# Patient Record
(demographics unavailable — no encounter records)

---

## 2024-11-07 NOTE — DISCUSSION/SUMMARY
[de-identified] : modify activities try OTC meds ice as needed try topical lidocaine for pain control reviewed current medications used by this patient home exercises for functional return  RE:  TELLO GARCIA   [id number]   Attention:  Nurse Reviewer /Medical Director    Based on my patient's condition, I strongly believe that the MRI both shoulders is medically.necessary.   The patient has failed oral meds, injections and PT and conservative treatment in combination or by themselves and therefore needs the MRI.   The MRI will dictate further treatment t recommendations.

## 2024-11-07 NOTE — PHYSICAL EXAM
[Sitting] : sitting [4___] : internal rotation 4[unfilled]/5 [Right] : right shoulder [There are no fractures, subluxations or dislocations. No significant abnormalities are seen] : There are no fractures, subluxations or dislocations. No significant abnormalities are seen [Bilateral] : shoulder bilaterally [] : no scapular winging [Degenerative change] : Degenerative change [FreeTextEntry1] : L >R [TWNoteComboBox7] : active forward flexion 160 degrees [TWNoteComboBox6] : internal rotation 20 degrees [de-identified] : external rotation 30 degrees

## 2024-11-07 NOTE — HISTORY OF PRESENT ILLNESS
[Rest] : rest [Sudden] : sudden [7] : 7 [6] : 6 [Sharp] : sharp [Tingling] : tingling [Intermittent] : intermittent [Meds] : meds [Lying in bed] : lying in bed [de-identified] : 66 year old RHD male with pain in the right shoulder, symptoms started about 3 months ago on R , longer time on left, no specific injury. Pain at night when sleeping, reaching over head, behind back, lifting. No injury. He denies radicular complaints into the right arm. No prior history of injury to the shoulder. Uses motrin with some help [] : no [FreeTextEntry1] : BT shoulder  [FreeTextEntry5] : Patient states NO INJURY. Pain began about 3 months ago and as been getting worse. LT shoulder has been hurting for a longer time and is going into hand.  [FreeTextEntry7] : bicep and left hand  [FreeTextEntry9] : motrin

## 2024-11-25 NOTE — DISCUSSION/SUMMARY
[de-identified] : Patient allowed to gently start resuming activities. Discussed change to medication prescription and usage. Bracing options discussed with patient for stability and support. Activity modification as needed Discussed poss future surgery, pt deciding, questions answered, no guarantees AS both benny danielle, labral debride try topical lidocaine for pain control reviewed current medications used by this patient Home exercises for functional return

## 2024-11-25 NOTE — HISTORY OF PRESENT ILLNESS
[Dull/Aching] : dull/aching [Sharp] : sharp [Leisure] : leisure [Sleep] : sleep [Rest] : rest [Meds] : meds [de-identified] :  pain in the right shoulder, symptoms started over 3 months ago on R , longer time on left, no specific injury. Pain at night when sleeping, reaching over head, behind back, lifting. No injury. He denies radicular complaints into the right arm. No prior history of injury to the shoulder. Uses motrin with some help, had MRIs [Sudden] : sudden [7] : 7 [6] : 6 [Tingling] : tingling [Intermittent] : intermittent [Lying in bed] : lying in bed [] : no [FreeTextEntry1] : BT shoulder  [FreeTextEntry5] : Patient states NO INJURY. Pain began about 3 months ago and as been getting worse. LT shoulder has been hurting for a longer time and is going into hand.  [FreeTextEntry7] : bicep and left hand  [FreeTextEntry9] : motrin  [de-identified] : Dr. Jonas [de-identified] : MRI

## 2024-11-25 NOTE — PHYSICAL EXAM
[Bilateral] : shoulder bilaterally [Sitting] : sitting [5___] : internal rotation 5[unfilled]/5 [] : motor and sensory intact distally [FreeTextEntry8] : sl [TWNoteComboBox7] : active forward flexion 160 degrees [TWNoteComboBox6] : internal rotation 20 degrees [de-identified] : external rotation 30 degrees

## 2024-11-25 NOTE — DATA REVIEWED
[Right] : of the right [MRI] : MRI [Left] : left [Shoulder] : shoulder [Report was reviewed and noted in the chart] : The report was reviewed and noted in the chart [I reviewed the films/CD and agree] : I reviewed the films/CD and agree [FreeTextEntry1] : labral tear, tendonitis, strain RTC IS [FreeTextEntry2] : labral tear, IS tendonitis, strain RTC, degen changes

## 2024-12-03 NOTE — IMAGING
[de-identified] : CSPINE Inspection: No rash or ecchymosis Palpation: TTP in traps, rhomboids, paracervicals ROM: Limited all planes Strength: 5/5 bilateral deltoid, biceps, triceps, wrist flexors, wrist extensors, , abductors Sensation: Sensation present to light touch bilateral C5-T1 distributions Reflexes: Negative Choudhury's bilaterally able to tandem gait [Facet arthropathy] : Facet arthropathy [Disc space narrowing] : Disc space narrowing

## 2024-12-03 NOTE — HISTORY OF PRESENT ILLNESS
[de-identified] : Dr. Jonas note- pain in the right shoulder, symptoms started over 3 months ago on R , longer time on left, no specific injury. Pain at night when sleeping, reaching over head, behind back, lifting. No injury. He denies radicular complaints into the right arm. No prior history of injury to the shoulder. Uses motrin with some help, had MRIs Pt seen by non-spine partners in the past ================================= 12/3/24- TELLO GARCIA is a 66 year old male presenting with neck pain, no reported injury. Patient states he has pain in the neck that travels down the LEFT arm for the past few months; N/T into the fingers. Patient was treated by Dr. Jonas for the shoulder and was told to follow up for the neck. RHDM. Not in PT. Meloxicam for shoulder pain somewhat helpful for neck.  No bb dysfunction. No balance issues, walks unassisted, no handrails.

## 2024-12-03 NOTE — ASSESSMENT
[FreeTextEntry1] : PT, meds  Will discuss MRI Gabapentin- Patient advised of sedating effects, instructed not to drive, operate machinery, or take with other sedating medications. Advised of need to taper on/off medication and risk of abruptly stopping gabapentin.

## 2025-01-06 NOTE — DISCUSSION/SUMMARY
[de-identified] : modify activities try OTC meds ice as needed try topical lidocaine for pain control reviewed current medications used by this patient home exercises for functional return 01/06/2025    [facility]  PETER JOSE   [id number]    Attention:  Nurse Reviewer /Medical Director  I am writing this letter as a medical necessity for PT program. Patient has tried analgesics, non-steroid anti-inflammatory agents,  hot or cold compresses,injections of corticosteroids, etc)  which in combination or by themselves has not worked. Based on my patient's condition, I strongly believe that the PT is medically needed.   Thank you for your time and consideration.

## 2025-01-06 NOTE — HISTORY OF PRESENT ILLNESS
[8] : 8 [4] : 4 [Dull/Aching] : dull/aching [Sharp] : sharp [Frequent] : frequent [Leisure] : leisure [Sleep] : sleep [Rest] : rest [Meds] : meds [Physical therapy] : physical therapy [Lying in bed] : lying in bed [de-identified] : pain in the right shoulder, symptoms started over 34 or 5 months ago on Right,  for a longer time on left, no specific injury. Pain at night when sleeping, reaching over head, behind back, lifting. No injury.  Labral tears and cuff tendinitis bilateral shoulders on MRI.  Symptoms have slightly increased symptoms. His PMD does not want him to have csi [] : no [FreeTextEntry1] : Right Shoulder [FreeTextEntry9] : prescribed meds [de-identified] : Reaching [de-identified] : Dr. Jonas

## 2025-01-06 NOTE — PHYSICAL EXAM
[Bilateral] : shoulder bilaterally [Sitting] : sitting [5___] : internal rotation 5[unfilled]/5 [] : no swelling [FreeTextEntry8] : sl [TWNoteComboBox7] : active forward flexion 160 degrees [TWNoteComboBox6] : internal rotation 25 degrees [de-identified] : external rotation 30 degrees

## 2025-01-21 NOTE — IMAGING
[Facet arthropathy] : Facet arthropathy [Disc space narrowing] : Disc space narrowing [de-identified] : CSPINE Inspection: No rash or ecchymosis Palpation: TTP in traps, rhomboids, paracervicals ROM: Limited all planes Strength: 5/5 bilateral deltoid, biceps, triceps, wrist flexors, wrist extensors, , abductors Sensation: Sensation present to light touch bilateral C5-T1 distributions Reflexes: Negative Choudhury's bilaterally able to tandem gait

## 2025-01-21 NOTE — ASSESSMENT
[FreeTextEntry1] : Patient presents to office following up for persistent neck pain with intermittent radiculopathy.  cherie conservative measures of nsaids, PT.  Will try to acquire an MRI of cervical spine to eval severity of degeneration, HNP f/u after MRI to go over results or sooner if worsening of symptoms, educated on red flag symptoms  nsaids prn for pain NSAIDs- Patient warned of risk of medication to GI tract, increased blood pressure, cardiac risk, and risk of fluid retention.  Advised to clear medication with internist or PCP if any concurrent health problem with heart, blood pressure, or GI system exists. Gabapentin- Patient advised of sedating effects, instructed not to drive, operate machinery, or take with other sedating medications. Advised of need to taper on/off medication and risk of abruptly stopping gabapentin.

## 2025-01-21 NOTE — HISTORY OF PRESENT ILLNESS
[de-identified] : Dr. Jonas note- pain in the right shoulder, symptoms started over 3 months ago on R , longer time on left, no specific injury. Pain at night when sleeping, reaching over head, behind back, lifting. No injury. He denies radicular complaints into the right arm. No prior history of injury to the shoulder. Uses motrin with some help, had MRIs Pt seen by non-spine partners in the past ================================= 12/3/24- TELLO GARCIA is a 66 year old male presenting with neck pain, no reported injury. Patient states he has pain in the neck that travels down the LEFT arm for the past few months; N/T into the fingers. Patient was treated by Dr. Jonas for the shoulder and was told to follow up for the neck. RHDM. Not in PT. Meloxicam for shoulder pain somewhat helpful for neck.  No bb dysfunction. No balance issues, walks unassisted, no handrails.  1/21/25- following up for persistent neck pain with intermittent radicular symptoms. since last visit symptoms have persisted, attending PT worsened pain to point affecting quality of life and preventing some adl's.

## 2025-02-25 NOTE — HISTORY OF PRESENT ILLNESS
[Neck] : neck [de-identified] : 02/25/2025: 66 year-old male patient presents for neck pain and a 2nd opinion. Has C MRI from 1/29/25. Patient has done PT with mild relief for the past 6-8 weeks. Has taken nsaids but not too frequently due to nausea. Patient notes no weakness with BUE. Patient has pain the R shoulder with radicular pain in the LUE. Sxs started August 2024.

## 2025-02-25 NOTE — DATA REVIEWED
[MRI] : MRI [Cervical Spine] : cervical spine [Report was reviewed and noted in the chart] : The report was reviewed and noted in the chart [I independently reviewed and interpreted images and report] : I independently reviewed and interpreted images and report [FreeTextEntry1] : R severe NF narrowing C2-3, C3-6 stenosis

## 2025-02-25 NOTE — ASSESSMENT
[FreeTextEntry1] : 66 year-old male patient presents for neck pain and a 2nd opinion. Has C MRI from 1/29/25. Patient has done PT with mild relief for the past 6-8 weeks. Has taken nsaids but not too frequently due to nausea. Patient notes no weakness with BUE. Patient has pain the R shoulder with radicular pain in the LUE. Discussed risk of not undergoing surgery. Since patient doesn't report weakness then surgical intervention is not urgent.  there is multilevel cord compression with deformation and shifting, no myelomalacia; IF something unusual like a bad car accident or a fall from even a standing height does put his cord at heightened risk for something neurologically catastrophic to occur;  f/u 4-6 months to check in with patient. If sxs worsen then f/u sooner

## 2025-02-26 NOTE — HISTORY OF PRESENT ILLNESS
[8] : 8 [4] : 4 [Dull/Aching] : dull/aching [Sharp] : sharp [Frequent] : frequent [Leisure] : leisure [Sleep] : sleep [Rest] : rest [Meds] : meds [Physical therapy] : physical therapy [Lying in bed] : lying in bed [de-identified] : pain in the right shoulder, symptoms started many months ago on Right,been bothering him for a longer time on left.. Pain at night when sleeping, reaching over head, behind back, lifting. No injury.  Labral tears and cuff tendinitis bilateral shoulders on MRI.  Symptoms have slightly increased symptoms. His PMD does not want him to have csi [] : no [FreeTextEntry1] : Right Shoulder [FreeTextEntry9] : prescribed meds [de-identified] : Reaching [de-identified] : Dr. Jonas

## 2025-02-26 NOTE — PHYSICAL EXAM
[Bilateral] : shoulder bilaterally [Sitting] : sitting [5___] : internal rotation 5[unfilled]/5 [] : motor and sensory intact distally [FreeTextEntry8] : sl [TWNoteComboBox7] : active forward flexion 160 degrees [TWNoteComboBox6] : internal rotation 30 degrees [de-identified] : external rotation 30 degrees

## 2025-02-26 NOTE — DISCUSSION/SUMMARY
[de-identified] : modify activities try OTC meds ice as needed try topical lidocaine for pain control reviewed current medications used by this patient home exercises for functional return 2/26/25  [facility]  [facility]ARC[facility]   [id number]    Attention:  Nurse Reviewer /Medical Director  I am writing this letter as a medical necessity for PT program. Patient has tried analgesics, non-steroid anti-inflammatory agents,  hot or cold compresses,injections of corticosteroids, etc)  which in combination or by themselves has not worked. Based on my patient's condition, I strongly believe that the PT is medically needed.   Thank you for your time and consideration.    poss surgery in future

## 2025-03-04 NOTE — HISTORY OF PRESENT ILLNESS
[de-identified] : Dr. Jonas note- pain in the right shoulder, symptoms started over 3 months ago on R , longer time on left, no specific injury. Pain at night when sleeping, reaching over head, behind back, lifting. No injury. He denies radicular complaints into the right arm. No prior history of injury to the shoulder. Uses motrin with some help, had MRIs Pt seen by non-spine partners in the past  1/29/25 Cervical MRI  - report noted in chart.  Ind. review- Severe stenosis C3-7 ================================= 12/3/24- TELLO GARCIA is a 66 year old male presenting with neck pain, no reported injury. Patient states he has pain in the neck that travels down the LEFT arm for the past few months; N/T into the fingers. Patient was treated by Dr. Jonas for the shoulder and was told to follow up for the neck. RHDM. Not in PT. Meloxicam for shoulder pain somewhat helpful for neck.  No bb dysfunction. No balance issues, walks unassisted, no handrails.  1/21/25- following up for persistent neck pain with intermittent radicular symptoms. since last visit symptoms have persisted, attending PT worsened pain to point affecting quality of life and preventing some adl's.  2/11/25- MRI f/u. Stil down the LUE. Patient reports shakiness in hands R>L 3/4/25- patient presents to office f/u for improved cervical pain, Physical therapy 2x a week. intermittent pins/needles sensation. Still feels shaky in the hands. Not dropping things currently.

## 2025-03-04 NOTE — IMAGING
[Facet arthropathy] : Facet arthropathy [Disc space narrowing] : Disc space narrowing [de-identified] : CSPINE Inspection: No rash or ecchymosis Palpation: TTP in traps, rhomboids, paracervicals ROM: Limited all planes Strength: 5/5 bilateral deltoid, biceps, triceps, wrist flexors, wrist extensors, , abductors Sensation: Sensation present to light touch bilateral C5-T1 distributions Reflexes: Negative Choudhury's bilaterally able to tandem gait

## 2025-03-04 NOTE — ASSESSMENT
[FreeTextEntry1] : Patient presents to office following up for persistent neck pain with intermittent radiculopathy.  conservative measures of PT with relief.    Severe stenosis C3-7 Discussed that patient won't need surgery urgently, but will need to eventually.  Have patient monitor sxs to note for any changes in ADL.  f/u 3 months  We have discussed the diagnosis cervical myelopathy. We have discussed the step-wise nature of neurologic decline. We have discussed the rationale for surgery for myelopathy, to maintain neurologic function, and that gain of function is not a guarantee. We have discussed monitoring for progression of disease.

## 2025-04-09 NOTE — HISTORY OF PRESENT ILLNESS
[Rest] : rest [Meds] : meds [Ice] : ice [Physical therapy] : physical therapy [de-identified] : pain in the right shoulder, symptoms for many months ago on Right,been bothering him for a longer time than the left.. Pain at night when sleeping, reaching over head, behind back, lifting. No injury.  Labral tears and cuff tendinitis bilateral shoulders on MRI L has more OA  Symptoms have slightly increased  [8] : 8 [4] : 4 [Dull/Aching] : dull/aching [Sharp] : sharp [Frequent] : frequent [Leisure] : leisure [Sleep] : sleep [Lying in bed] : lying in bed [] : no [FreeTextEntry1] : Right Shoulder [FreeTextEntry5] : Patient notes no changes since last visit [FreeTextEntry9] : prescribed meds [de-identified] : Reaching [de-identified] : Dr. Jonas

## 2025-04-09 NOTE — PHYSICAL EXAM
[Bilateral] : shoulder bilaterally [Sitting] : sitting [5___] : internal rotation 5[unfilled]/5 [] : motor and sensory intact distally [FreeTextEntry8] : sl [TWNoteComboBox7] : active forward flexion 160 degrees [TWNoteComboBox6] : internal rotation 30 degrees [de-identified] : external rotation 30 degrees

## 2025-04-09 NOTE — DISCUSSION/SUMMARY
[de-identified] : modify activities try OTC meds ice as needed try topical lidocaine for pain control reviewed current medications used by this patient home exercises for functional return low dose csi poss surgery 4/9/25  RE:  TELLO GARCIA   [id number]    Attention:  Nurse Reviewer /Medical Director  I am writing this letter as a medical necessity for PT program. Patient has tried analgesics, non-steroid anti-inflammatory agents,  hot or cold compresses,injections of corticosteroids, etc)  which in combination or by themselves has not worked. Based on my patient's condition, I strongly believe that the PT is medically needed.   Thank you for your time and consideration.    poss surgery in future